# Patient Record
Sex: MALE
[De-identification: names, ages, dates, MRNs, and addresses within clinical notes are randomized per-mention and may not be internally consistent; named-entity substitution may affect disease eponyms.]

---

## 2022-03-30 ENCOUNTER — NURSE TRIAGE (OUTPATIENT)
Dept: OTHER | Facility: CLINIC | Age: 67
End: 2022-03-30

## 2022-03-30 NOTE — TELEPHONE ENCOUNTER
Subjective: Caller states \"last Monday he was shaving, nicked above his lip, it bled for 3 hours, it finally stopped, and now it started bleeding 3 hours ago after shaving. \"   Caller states Carmelita Bailey has [employer]MO, and gave number, but unable to find ANGELIQUE meyer in the system. Current Symptoms: it had scabbed and he forgot and shaved over it this afternoon. Right above his lip, tiny spot. Has held pressure continuously for 10-15 minutes, also ice, \"blood stop\"-wound dressing that coagulates the blood. He states he is on blood thinners. He is holding pressure again. They don't want to go to ER. Recommend he hold for a continuous 20-30 minutes. But if bleeding continues, will need to proceed to ER. Onset: 3 hours ago; sudden    Pain Severity: 0/10; N/A; none    Recommended disposition: Go to ED Now    Care advice provided, patient verbalizes understanding; denies any other questions or concerns; instructed to call back for any new or worsening symptoms. Patient/caller agrees to proceed to ER, since urgent care is closed. Emergency Department    This triage is a result of a call to [address] [place] [address] of [facility]. Please do not respond to the triage nurse through this encounter. Any subsequent communication should be directly with the patient.       Reason for Disposition   [1] Bleeding AND [2] won't stop after 10 minutes of direct pressure (using correct technique)    Protocols used: SKIN INJURY-ADULT-